# Patient Record
Sex: FEMALE
[De-identification: names, ages, dates, MRNs, and addresses within clinical notes are randomized per-mention and may not be internally consistent; named-entity substitution may affect disease eponyms.]

---

## 2018-01-29 ENCOUNTER — HOSPITAL ENCOUNTER (EMERGENCY)
Dept: HOSPITAL 42 - ED | Age: 58
LOS: 1 days | Discharge: HOME | End: 2018-01-30
Payer: MEDICAID

## 2018-01-29 VITALS — RESPIRATION RATE: 18 BRPM

## 2018-01-29 VITALS — BODY MASS INDEX: 37.2 KG/M2
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
## 2018-01-29 DIAGNOSIS — T78.49XA: Primary | ICD-10-CM

## 2018-01-29 PROCEDURE — 96375 TX/PRO/DX INJ NEW DRUG ADDON: CPT

## 2018-01-29 PROCEDURE — 99282 EMERGENCY DEPT VISIT SF MDM: CPT

## 2018-01-29 PROCEDURE — 96374 THER/PROPH/DIAG INJ IV PUSH: CPT

## 2018-01-29 NOTE — ED PDOC
Arrival/HPI





- General


Chief Complaint: Dental Pain


Time Seen by Provider: 18 22:34


Historian: Patient





- History of Present Illness


Narrative History of Present Illness (Text): 


18 23:00


Cami Tirado is a 57 year old female who presents to the Emergency 

department complaining of swelling to her upper lip to her left cheek tonight. 

Patient notes some associated pruritus/tingling sensation. Patient denies any 

diet changes, changes in lotion/detergents, throat swelling, shortness of breath

, wheezing, vomiting, dizziness, recent trauma/injury, or any other complaints. 





Symptom Onset: Gradual


Symptom Course: Unchanged


Activities at Onset: Light


Context: Home





Past Medical History





- Provider Review


Nursing Documentation Reviewed: Yes





- Infectious Disease


Hx of Infectious Diseases: None





- Psychiatric


Hx Substance Use: No





- Surgical History


Hx  Section: Yes (x3)





- Anesthesia


Hx Anesthesia Reactions: No


Hx Malignant Hyperthermia: No





Family/Social History





- Physician Review


Nursing Documentation Reviewed: Yes


Family/Social History: Unknown Family HX


Smoking Status: Never Smoked


Hx Alcohol Use: No


Hx Substance Use: No





Allergies/Home Meds


Allergies/Adverse Reactions: 


Allergies





No Known Allergies Allergy (Unverified 18 23:07)


 











Review of Systems





- Physician Review


All systems were reviewed & negative as marked: Yes





- Review of Systems


Constitutional: Normal.  absent: Fevers


Eyes: Normal


ENT: Normal


Respiratory: Normal.  absent: SOB, Cough


Cardiovascular: Normal.  absent: Chest Pain


Gastrointestinal: Normal.  absent: Abdominal Pain, Diarrhea, Nausea, Vomiting


Genitourinary Female: Normal.  absent: Dysuria, Hematuria, Urine Output Changes

, Vaginal Bleeding


Musculoskeletal: Normal.  absent: Back Pain, Neck Pain


Skin: Other (+swelling to upper lip to left cheek).  absent: Rash


Neurological: Normal.  absent: Headache, Dizziness


Endocrine: Normal


Hemo/Lymphatic: Normal


Psychiatric: Normal





Physical Exam


Vital Signs Reviewed: Yes


Vital Signs











  Temp Pulse Resp BP Pulse Ox


 


 18 21:14  98.3 F  89  18  181/98 H  96











Temperature: Afebrile


Blood Pressure: Hypertensive


Pulse: Regular


Respiratory Rate: Normal


Appearance: Positive for: Well-Appearing, Non-Toxic, Comfortable


Pain Distress: None


Mental Status: Positive for: Alert and Oriented X 3





- Systems Exam


Head: Present: Atraumatic, Normocephalic, Swelling (Non-erythematous swelling 

to left cheek)


Pupils: Present: PERRL


Extroacular Muscles: Present: EOMI


Conjunctiva: Present: Normal


Ears: Present: Normal, NORMAL TM, Normal Canal.  No: Erythema, TM Bulging, Fluid


Mouth: Present: Moist Mucous Membranes.  No: Normal Lips (Non-erythematous 

swelling to upper lip )


Pharnyx: Present: Normal.  No: ERYTHEMA, EXUDATE, TONSILS ENLARGED, 

Peritonsilar Swelling, Uvular Deviation, Muffled/Hoarse Voice, Strider, Soft 

Palate/Uvular Edema


Nose (External): Present: Atraumatic


Nose (Internal): Present: Normal Inspection


Neck: Present: Normal Range of Motion


Respiratory/Chest: Present: Clear to Auscultation, Good Air Exchange.  No: 

Respiratory Distress, Accessory Muscle Use


Cardiovascular: Present: Regular Rate and Rhythm, Normal S1, S2.  No: Murmurs


Abdomen: Present: Normal Bowel Sounds.  No: Tenderness, Distention, Peritoneal 

Signs


Back: Present: Normal Inspection


Upper Extremity: Present: Normal Inspection.  No: Cyanosis, Edema


Lower Extremity: Present: Normal Inspection.  No: Edema


Neurological: Present: GCS=15, CN II-XII Intact, Speech Normal


Skin: Present: Warm, Dry, Normal Color.  No: Rashes


Psychiatric: Present: Alert, Oriented x 3, Normal Insight, Normal Concentration





Medical Decision Making


ED Course and Treatment: 


18 23:05


Impression:


57 year old female complaining of swelling to upper lip to her left cheek 

tonight.





Differential Diagnosis included but are not limited to:  allergic reaction





Plan:


-- Benadryl


-- Solu-medrol


-- Reassess and disposition





Progress Notes:











- Medication Orders


Current Medication Orders: 











Discontinued Medications





Diphenhydramine HCl (Benadryl)  25 mg IVP ONCE ONE


   Stop: 18 23:08


   Last Admin: 18 23:20  Dose: 25 mg





IVP Administration


 Document     18 23:20  YUDY  (Rec: 18 23:21  YUDY  [facility] [id number]


     Charges for Administration


      # of IVP Administrations                   1





Diphenhydramine HCl (Benadryl)  25 mg PO ONCE STA


   Stop: 18 23:08


   Last Admin: 18 23:21  Dose: 25 mg





Methylprednisolone (Solu-Medrol)  125 mg IVP ONCE ONE


   Stop: 18 23:08


   Last Admin: 18 23:20  Dose: 125 mg





IVP Administration


 Document     18 23:20  YUDY  (Rec: 18 23:20  YUDY  [facility] [id number]


     Charges for Administration


      # of IVP Administrations                   1














- Scribe Statement


The provider has reviewed the documentation as recorded by the Scribe


Eliz Stacy





All medical record entries made by the Scribe were at my direction and 

personally dictated by me. I have reviewed the chart and agree that the record 

accurately reflects my personal performance of the history, physical exam, 

medical decision making, and the department course for this patient. I have 

also personally directed, reviewed, and agree with the discharge instructions 

and disposition.





Disposition/Present on Arrival





- Present on Arrival


Any Indicators Present on Arrival: No


History of DVT/PE: No


History of Uncontrolled Diabetes: No


Urinary Catheter: No


History of Decub. Ulcer: No


History Surgical Site Infection Following: None





- Disposition


Have Diagnosis and Disposition been Completed?: Yes


Diagnosis: 


 Allergic reaction





Disposition: HOME/ ROUTINE


Disposition Time: 00:49


Patient Plan: Discharge


Condition: GOOD


Additional Instructions: 


Take meds as prescribed/follow up with your doctor this week


Prescriptions: 


DiphenhydrAMINE [Benadryl] 50 mg PO Q6 PRN #24 cap


 PRN Reason: Itching / Pruritus


predniSONE [Prednisone] 40 mg PO DAILY #10 tab


Referrals: 


Irwin Braden APN [Primary Care Provider] - Follow up with primary


Forms:  eROI (Urdu), WORK NOTE

## 2018-01-30 VITALS
HEART RATE: 84 BPM | TEMPERATURE: 98.1 F | DIASTOLIC BLOOD PRESSURE: 84 MMHG | OXYGEN SATURATION: 99 % | SYSTOLIC BLOOD PRESSURE: 156 MMHG